# Patient Record
Sex: FEMALE | Race: WHITE | Employment: UNEMPLOYED | URBAN - METROPOLITAN AREA
[De-identification: names, ages, dates, MRNs, and addresses within clinical notes are randomized per-mention and may not be internally consistent; named-entity substitution may affect disease eponyms.]

---

## 2020-09-13 ENCOUNTER — HOSPITAL ENCOUNTER (INPATIENT)
Age: 35
LOS: 3 days | Discharge: HOME | DRG: 754 | End: 2020-09-16
Attending: PSYCHIATRY & NEUROLOGY | Admitting: INTERNAL MEDICINE
Payer: COMMERCIAL

## 2020-09-13 PROCEDURE — 1240000000 HC EMOTIONAL WELLNESS R&B

## 2020-09-13 RX ORDER — PRAZOSIN HYDROCHLORIDE 1 MG/1
1 CAPSULE ORAL NIGHTLY
Status: ON HOLD | COMMUNITY
End: 2020-09-16 | Stop reason: HOSPADM

## 2020-09-13 RX ORDER — HYDROXYZINE 50 MG/1
50 TABLET, FILM COATED ORAL 3 TIMES DAILY PRN
Status: DISCONTINUED | OUTPATIENT
Start: 2020-09-13 | End: 2020-09-13 | Stop reason: CLARIF

## 2020-09-13 RX ORDER — VENLAFAXINE HYDROCHLORIDE 37.5 MG/1
37.5 CAPSULE, EXTENDED RELEASE ORAL DAILY
Status: ON HOLD | COMMUNITY
End: 2020-09-16 | Stop reason: HOSPADM

## 2020-09-13 RX ORDER — TRAZODONE HYDROCHLORIDE 50 MG/1
50 TABLET ORAL NIGHTLY PRN
Status: DISCONTINUED | OUTPATIENT
Start: 2020-09-13 | End: 2020-09-16 | Stop reason: HOSPADM

## 2020-09-13 RX ORDER — IBUPROFEN 400 MG/1
400 TABLET ORAL EVERY 6 HOURS PRN
Status: DISCONTINUED | OUTPATIENT
Start: 2020-09-13 | End: 2020-09-16 | Stop reason: HOSPADM

## 2020-09-13 RX ORDER — POLYETHYLENE GLYCOL 3350 17 G
2 POWDER IN PACKET (EA) ORAL
Status: DISCONTINUED | OUTPATIENT
Start: 2020-09-13 | End: 2020-09-16 | Stop reason: HOSPADM

## 2020-09-13 RX ORDER — NICOTINE 21 MG/24HR
1 PATCH, TRANSDERMAL 24 HOURS TRANSDERMAL DAILY PRN
Status: DISCONTINUED | OUTPATIENT
Start: 2020-09-13 | End: 2020-09-16 | Stop reason: HOSPADM

## 2020-09-13 RX ORDER — HYDROXYZINE PAMOATE 50 MG/1
50 CAPSULE ORAL 3 TIMES DAILY PRN
Status: DISCONTINUED | OUTPATIENT
Start: 2020-09-13 | End: 2020-09-16 | Stop reason: HOSPADM

## 2020-09-13 RX ORDER — ACETAMINOPHEN 325 MG/1
650 TABLET ORAL EVERY 4 HOURS PRN
Status: DISCONTINUED | OUTPATIENT
Start: 2020-09-13 | End: 2020-09-16 | Stop reason: HOSPADM

## 2020-09-13 ASSESSMENT — SLEEP AND FATIGUE QUESTIONNAIRES
DIFFICULTY ARISING: NO
DIFFICULTY FALLING ASLEEP: YES
RESTFUL SLEEP: NO
DO YOU USE A SLEEP AID: NO
DIFFICULTY STAYING ASLEEP: YES
AVERAGE NUMBER OF SLEEP HOURS: 4
SLEEP PATTERN: DIFFICULTY FALLING ASLEEP;NIGHTMARES/TERRORS
DO YOU HAVE DIFFICULTY SLEEPING: YES

## 2020-09-13 ASSESSMENT — LIFESTYLE VARIABLES: HISTORY_ALCOHOL_USE: NO

## 2020-09-13 ASSESSMENT — PATIENT HEALTH QUESTIONNAIRE - PHQ9: SUM OF ALL RESPONSES TO PHQ QUESTIONS 1-9: 9
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
## 2020-09-13 NOTE — BH NOTE
`[facility] [place]  Admission Note     Admission Type:   Admission Type:  Involuntary    Reason for admission:  Reason for Admission: took an overdose of multiple benadryl in a suicide attempt    PATIENT STRENGTHS:  Strengths: Communication    Patient Strengths and Limitations:  Limitations: Hopeless about future, Unrealistic self-view, Difficult relationships / poor social skills, Lacks leisure interests, Tendency to isolate self    Addictive Behavior:   Addictive Behavior  In the past 3 months, have you felt or has someone told you that you have a problem with:  : None  Do you have a history of Chemical Use?: No  Do you have a history of Alcohol Use?: No  Do you have a history of Street Drug Abuse?: No  Histroy of Prescripton Drug Abuse?: No    Medical Problems:   Past Medical History:   Diagnosis Date    Concussion     UTI (urinary tract infection)        Status EXAM:  Status and Exam  Normal: No  Facial Expression: Flat, Sad  Affect: Blunt  Level of Consciousness: Alert  Mood:Normal: No  Mood: Depressed  Motor Activity:Normal: No  Motor Activity: Decreased  Interview Behavior: Evasive, Cooperative  Preception: [place] to Person, Christofer Carton to Time, [place] to Place, [place] to Situation  Attention:Normal: Yes  Thought Content:Normal: Yes  Hallucinations: None  Delusions: No  Memory:Normal: No  Memory: Poor Recent  Insight and Judgment: No  Insight and Judgment: Poor Insight, Poor Judgment  Present Suicidal Ideation: No  Present Homicidal Ideation: No    Tobacco Screening:  Practical Counseling, on admission, ivon X, if applicable and completed (first 3 are required if patient doesn't refuse):            ( )  Recognizing danger situations (included triggers and roadblocks)                    ( )  Coping skills (new ways to manage stress, exercise, relaxation techniques, changing routine, distraction)                                                           ( )  Basic information about quitting (benefits of quitting, techniques in how to quit, available resources  ( ) Referral for counseling faxed to Sarah                                           ( ) Patient refused counseling  ( ) Patient has not smoked in the last 30 days    Metabolic Screening:    No results found for: LABA1C    No results found for: CHOL  No results found for: TRIG  No results found for: HDL  No components found for: LDLCAL  No results found for: LABVLDL      There is no height or weight on file to calculate BMI. BP Readings from Last 2 Encounters:   09/13/20 130/82   07/30/18 (!) 142/102           Pt admitted with followings belongings:  Dentures: None  Vision - Corrective Lenses: None  Hearing Aid: None  Jewelry: None  Body Piercings Removed: N/A  Clothing: Pants, Shirt, Footwear  Were All Patient Medications Collected?: Not Applicable  Other Valuables: Cell phone     Patient oriented to surroundings and program expectations and copy of patient rights given. Received admission packet:  YES. Consents reviewed, signed YES. . Patient verbalize understanding:  YES.     Patient education on precautions: Capo Ochoa RN

## 2020-09-14 LAB
ANION GAP SERPL CALCULATED.3IONS-SCNC: 8 MMOL/L (ref 3–16)
BUN BLDV-MCNC: 10 MG/DL (ref 7–20)
CALCIUM SERPL-MCNC: 9.1 MG/DL (ref 8.3–10.6)
CHLORIDE BLD-SCNC: 102 MMOL/L (ref 99–110)
CHOLESTEROL, TOTAL: 162 MG/DL (ref 0–199)
CO2: 27 MMOL/L (ref 21–32)
CREAT SERPL-MCNC: 0.8 MG/DL (ref 0.6–1.1)
GFR AFRICAN AMERICAN: >60
GFR NON-AFRICAN AMERICAN: >60
GLUCOSE BLD-MCNC: 91 MG/DL (ref 70–99)
HDLC SERPL-MCNC: 83 MG/DL (ref 40–60)
LDL CHOLESTEROL CALCULATED: 55 MG/DL
POTASSIUM SERPL-SCNC: 3.3 MMOL/L (ref 3.5–5.1)
SODIUM BLD-SCNC: 137 MMOL/L (ref 136–145)
TRIGL SERPL-MCNC: 122 MG/DL (ref 0–150)
TSH SERPL DL<=0.05 MIU/L-ACNC: 0.93 UIU/ML (ref 0.27–4.2)
VLDLC SERPL CALC-MCNC: 24 MG/DL

## 2020-09-14 PROCEDURE — 99223 1ST HOSP IP/OBS HIGH 75: CPT | Performed by: PSYCHIATRY & NEUROLOGY

## 2020-09-14 PROCEDURE — 80048 BASIC METABOLIC PNL TOTAL CA: CPT

## 2020-09-14 PROCEDURE — 6370000000 HC RX 637 (ALT 250 FOR IP): Performed by: PSYCHIATRY & NEUROLOGY

## 2020-09-14 PROCEDURE — 1240000000 HC EMOTIONAL WELLNESS R&B

## 2020-09-14 PROCEDURE — 80061 LIPID PANEL: CPT

## 2020-09-14 PROCEDURE — 36415 COLL VENOUS BLD VENIPUNCTURE: CPT

## 2020-09-14 PROCEDURE — 83036 HEMOGLOBIN GLYCOSYLATED A1C: CPT

## 2020-09-14 PROCEDURE — 84443 ASSAY THYROID STIM HORMONE: CPT

## 2020-09-14 RX ORDER — NALTREXONE HYDROCHLORIDE 50 MG/1
50 TABLET, FILM COATED ORAL
Status: DISCONTINUED | OUTPATIENT
Start: 2020-09-14 | End: 2020-09-16 | Stop reason: HOSPADM

## 2020-09-14 RX ORDER — RISPERIDONE 1 MG/1
1 TABLET, FILM COATED ORAL 2 TIMES DAILY
Status: DISCONTINUED | OUTPATIENT
Start: 2020-09-14 | End: 2020-09-15

## 2020-09-14 RX ORDER — LAMOTRIGINE 25 MG/1
50 TABLET ORAL DAILY
Status: DISCONTINUED | OUTPATIENT
Start: 2020-09-14 | End: 2020-09-16 | Stop reason: HOSPADM

## 2020-09-14 RX ADMIN — RISPERIDONE 1 MG: 1 TABLET ORAL at 14:17

## 2020-09-14 RX ADMIN — RISPERIDONE 1 MG: 1 TABLET ORAL at 22:06

## 2020-09-14 RX ADMIN — TRAZODONE HYDROCHLORIDE 50 MG: 50 TABLET ORAL at 22:06

## 2020-09-14 RX ADMIN — NALTREXONE HYDROCHLORIDE 50 MG: 50 TABLET, FILM COATED ORAL at 14:17

## 2020-09-14 RX ADMIN — LAMOTRIGINE 50 MG: 25 TABLET ORAL at 14:17

## 2020-09-14 ASSESSMENT — SLEEP AND FATIGUE QUESTIONNAIRES
AVERAGE NUMBER OF SLEEP HOURS: 4
DIFFICULTY FALLING ASLEEP: YES
DO YOU HAVE DIFFICULTY SLEEPING: YES
DO YOU USE A SLEEP AID: NO
DIFFICULTY ARISING: NO
DIFFICULTY STAYING ASLEEP: YES
SLEEP PATTERN: DIFFICULTY FALLING ASLEEP
RESTFUL SLEEP: NO

## 2020-09-14 ASSESSMENT — LIFESTYLE VARIABLES: HISTORY_ALCOHOL_USE: YES

## 2020-09-14 ASSESSMENT — PATIENT HEALTH QUESTIONNAIRE - PHQ9: SUM OF ALL RESPONSES TO PHQ QUESTIONS 1-9: 18

## 2020-09-14 NOTE — H&P
Mother     No Known Problems Father        REVIEW OF SYSTEMS:       Constitutional: Negative for fever   HENT: Negative for sore throat   Eyes: Negative for redness   Respiratory: Negative  for dyspnea, cough   Cardiovascular: Negative for chest pain   Gastrointestinal: Negative for vomiting, diarrhea   Genitourinary: Negative for hematuria   Positive for vaginal discharge  Musculoskeletal: Negative for arthralgias   Skin: Negative for rash   Neurological: Negative for syncope   Hematological: Negative for adenopathy   Psychiatric/Behavorial: Negative for anxiety    PHYSICAL EXAM:    BP (!) 154/78   Pulse 78   Temp 97.7 °F (36.5 °C) (Temporal)   Resp 16   SpO2 98%     Gen: No distress. Alert. Eyes: PERRL. No sclera icterus. No conjunctival injection. ENT: No discharge. Pharynx clear. Neck:  Trachea midline. No JVD  Resp: No accessory muscle use. No crackles. No wheezes. No rhonchi. CV: Regular rate. Regular rhythm. No murmur. No rub. No edema. GI: Non-tender. Non-distended. Normal bowel sounds. Skin: Warm and dry. No rash on exposed extremities. M/S: No cyanosis. No clubbing. Neuro: Awake. Grossly nonfocal, CN II-XII intact     Psych: Oriented x 3. No anxiety or agitation.      U/A:    Lab Results   Component Value Date    NITRITE negative 05/21/2011    COLORU YELLOW 07/15/2018    WBCUA 10 07/15/2018    RBCUA 7 07/15/2018    MUCUS 2+ 01/21/2011    BACTERIA RARE 07/15/2018    CLARITYU CLOUDY 07/15/2018    SPECGRAV 1.016 07/15/2018    LEUKOCYTESUR SMALL 07/15/2018    BLOODU MODERATE 07/15/2018    GLUCOSEU Negative 07/15/2018    GLUCOSEU NEGATIVE 01/21/2011     Pertinent previous results reviewed     [facility] from [facility]  - Sinus tachycardia rate of 101  - QTc: 466  - no ST elevation, depression or T wave inversion    CBC normal    UDS positive for cocaine and tricyclic antidepressants    Serum HCG: negative    BMP: K - 3.1, otherwise normal    ASSESSMENT/PLAN:    #PTSD  #Bipolar DO  - per psychiatry team    #Suicidal Ideation  #Intentional Overdose  - OD on Benadryl - reported taking 15-20 tablets of twenty five mg Benadryl  - evaluated at [facility] ED, poison control was called, no activated charcoal recommended unless symptomatic; pt did not manifest any signs of anticholinergic toxicity -> medically cleared for [facility]    #Vaginal discharge  -Patient reports recurrent bacterial vaginosis for which she follows with gynecology. She states her symptoms currently are consistent with bacterial vaginosis and her gynecologist had called in a prescription for metronidazole vaginal gel for her prior to going to the emergency room. I will start a 5-day course of MetroGel now. #Hypokalemia  - K 3.3  - replaced with PO supplementation today     #Alcohol Abuse  -She reports binge drinking on 1 to 2 days/week.   Low concern for impending alcohol withdrawal    #Cocaine Abuse  - UDS + cocaine, she denies use     Edie Pope PA-C  9/15/2020 1:58 PM

## 2020-09-14 NOTE — PLAN OF CARE
Attempted to round on patient today during daily rounds. Pt was seen on the phone, very emotional and tearful. Pt would like to hold off on physical exam at this time and requested H&P to be completed tomorrow. Discussed with my colleague, Cristine Joyce PA-C, who will round on patient tomorrow for H&P.     Gina Hurtado PA-C 1:31 PM 9/14/2020

## 2020-09-14 NOTE — FLOWSHEET NOTE
09/14/20 [id number]   Leisure Activity 1   3 Favorite Leisure Activities surf the internet (social media)   Frequency > 2 hours/day   Last time today   Barriers to participating  social (ie. no one to do it with)   Leisure Activity 2   [address]ing ([address])   Frequency  > 2 hours/day   Last time  this week   Leisure Activity 3   [address]  listen to music (classic rock, 90s)   Frequency  <2 hours/day   Last time  this week   Social   Patient reports spending the majority of their free time alone   Patient verbalizes a preference for spending free time with a group   Patients perception of support system negative/weak  (\"I have no support system\")   Patients perception of barriers to socializing with others include(s) no perceived barriers   Social Details live with boyfriend, gets alone time   Beliefs & Coping   Has difficulty dealing with feelings   Yes   Internalizes feelings/Keeps feelings in No   Externalizes feelings through aggressiveness or poor temper control  Yes   Feels uncomfortable around others  No   Has difficulty talking to others  No   Depends on others for direction or decisions Yes  (boyfriend)   Difficulty dealing with anger of others  Yes   Difficulty dealing with own anger  Yes   Difficulty managing stress Yes   Frequently has difficulty with relationships  Yes   which,who,where at work;with friends/peers;in family   Has recently perceived/experienced loss, disappointment, humiliation or failure  Yes  (Pt reports losing custody of 5 y/o son)   General perception about self does not like self   Attitude about abilities occasionally fails   Locus of Control  sometimes   Belief about recovery Recovery is possible   Patient Identified Strengths  good cook, unable to identify further strength   Patient Identified Limitations  emotional disturbance (\"I have a bad fear of abandonment, so I push people away. I'm hard to get along with. \"   Perception of most stressful event prior to hospitalization Pt reports losing son in December   Perception of changes needed \"I want to quit drinking. It makes my emotions worse\"   Strengths and Limitations   Strengths Independent in basic self-care activities; Realistic self-view   Limitations Apathetic / unmotivated;General negative or hopeless attitude about future/recovery; Difficult relationships / poor social skills     Met with pt 1:1 in [employer] to complete AT/OT assessment. Pt sitting with legs raised, holding knees to chest with BUE tension, exaggerated wide-eyes. Pt reports largest influence on current emotional disturbance was the loss of custody of her son in December, leading to amajor shift in social structure, positive coping. Pt reports drinking alcohol as a means of escapism, avoiding opportunities to engage with others. Denies leisure interests at present. Will follow up to encourage engagement in group therapy, continue assessing psychosocial need, recreation/leisure preferences.     Soto Damon, MM, MT-BC

## 2020-09-14 NOTE — PLAN OF CARE
Patient was out with patient group, but spent most of time on the phone, talking to her boyfriend. Patient reported that she was here, because she overdosed on benadryl. Patient did state that she regrets the overdose. Patient stated being in the middle of a break-up, with her boyfriend. \"I'm not sure, where we stand. He hates me now. \" Patient denied feelings of self harm.  Joaquin Castillo R.N.

## 2020-09-14 NOTE — FLOWSHEET NOTE
Education   Education [employer] graduate -GED   Work History   Currently employed No   /VA involvement none   Leisure/Activity   Past interests boating, hiking   Present interests cook and drinking   Current daily activity take care of the house and cook   Social with friends/family No   Cultural and Spiritual   Spiritual concerns No   Cultural concerns No   Admitted to [facility] due to intentional OD on benadryl. Complains of a drinking problem and in inpatient rehab x1 year at [place] [address]. Interested in outpatient AoD services.

## 2020-09-14 NOTE — PROGRESS NOTES
Occupational Therapy      Received OT orders. Hold OT eval for H&P. Plan to re-attempt as time permits.     Sukhjinder Alan, OTR/L  #[id number]

## 2020-09-14 NOTE — H&P
alcohol abuse  Axis 2: Borderline Personality  Disorder   Herod 3: See Medical History  Axis 4: Problems with primary support group, Occupational problems, Housing problems, Economic problems and Other psychosocial and environmental problems      Tx plan:    prevent self injury, stabilize affect, restore sleep, treat depression, treat addictions, establish/maintain aftercare. All conditions present on admission are being treated while pt is hospitalized.      Medications  Current Facility-Administered Medications   Medication Dose Route Frequency Provider Last Rate Last Dose    lamoTRIgine (LAMICTAL) tablet 50 mg  50 mg Oral Daily Chey Mayes MD        naltrexone (DEPADE) tablet 50 mg  50 mg Oral Daily with breakfast Chey Mayes MD        risperiDONE (RISPERDAL) tablet 1 mg  1 mg Oral BID Chey Mayes MD        acetaminophen (TYLENOL) tablet 650 mg  650 mg Oral Q4H PRN Chey Mayes MD        ibuprofen (ADVIL;MOTRIN) tablet 400 mg  400 mg Oral Q6H PRN Chey Mayes MD        traZODone (DESYREL) tablet 50 mg  50 mg Oral Nightly PRN Chey Mayes MD        magnesium hydroxide (MILK OF MAGNESIA) 400 MG/5ML suspension 30 mL  30 mL Oral Daily PRN Chey Mayes MD        nicotine polacrilex (COMMIT) lozenge 2 mg  2 mg Oral Q2H PRN Chey Mayes MD        nicotine (NICODERM CQ) 21 MG/24HR 1 patch  1 patch Transdermal Daily PRN Chey Mayes MD        nicotine polacrilex (NICORETTE) gum 4 mg  4 mg Oral Q2H PRN Chey Mayes MD        hydrOXYzine (VISTARIL) capsule 50 mg  50 mg Oral TID PRN Chey Mayes MD          lamoTRIgine  50 mg Oral Daily    naltrexone  50 mg Oral Daily with breakfast    risperiDONE  1 mg Oral BID      PRN Meds: acetaminophen, ibuprofen, traZODone, magnesium hydroxide, nicotine polacrilex, nicotine, nicotine polacrilex, hydrOXYzine   Estimated length of stay: 2-3 days  Prognosis:  good   Criteria for Discharge:    Not suicidal, sleeping well, affect stable, depression improving, eating well, aftercare arranged.      Spent at least 70 minutes with evaluation process and more than 50% of the time was spent obtaining history and planning treatment with the patient

## 2020-09-14 NOTE — BH NOTE
[facility]  Initial Interdisciplinary Treatment Plan NOTE    Review Date & Time: 9/14/2020 0930    Patient was not in treatment team    Admission Type:   Admission Type: Involuntary    Reason for admission:  Reason for Admission: took an overdose of multiple benadryl in a suicide attempt      Estimated Length of Stay Update:  2-3 days  Estimated Discharge Date Update: 9/16/2020    PATIENT STRENGTHS:  Patient Strengths Strengths: Social Skills, No significant Physical Illness  Patient Strengths and Limitations:Limitations: Tendency to isolate self, Difficult relationships / poor social skills, Demonstrates discomfort with /lack of social skills, Multiple barriers to leisure interests, Difficulty problem solving/relies on others to help solve problems, Inappropriate/potentially harmful leisure interests, Lacks leisure interests  Addictive Behavior:Addictive Behavior  In the past 3 months, have you felt or has someone told you that you have a problem with:  : None  Do you have a history of Chemical Use?: No  Do you have a history of Alcohol Use?: Yes  Do you have a history of Street Drug Abuse?: No  Histroy of Prescripton Drug Abuse?: No  Medical Problems:  Past Medical History:   Diagnosis Date    Concussion     UTI (urinary tract infection)        EDUCATION:   Learner Progress Toward Treatment Goals: Reviewed results and recommendations of this team and Reviewed goals and plan of care    Method: Individual    Outcome: Verbalized understanding and Needs reinforcement    PATIENT GOALS: Patient did not attend goals group    PLAN/TREATMENT RECOMMENDATIONS UPDATE:  Patient will take medication as prescribed, eat 75% of meals, attend groups, participate in milieu activities, participate in treatment team and care planning for discharge and follow up.     GOALS UPDATE:   Time frame for Short-Term Goals: 1-2 days    Elba Blanc RN

## 2020-09-15 PROBLEM — N76.0 BACTERIAL VAGINOSIS: Status: ACTIVE | Noted: 2020-09-15

## 2020-09-15 PROBLEM — F10.10 ALCOHOL CONSUMPTION BINGE DRINKING: Status: ACTIVE | Noted: 2020-09-15

## 2020-09-15 PROBLEM — E87.6 HYPOKALEMIA: Status: ACTIVE | Noted: 2020-09-15

## 2020-09-15 PROBLEM — B96.89 BACTERIAL VAGINOSIS: Status: ACTIVE | Noted: 2020-09-15

## 2020-09-15 LAB
ESTIMATED AVERAGE GLUCOSE: 102.5 MG/DL
HBA1C MFR BLD: 5.2 %

## 2020-09-15 PROCEDURE — 6370000000 HC RX 637 (ALT 250 FOR IP): Performed by: PHYSICIAN ASSISTANT

## 2020-09-15 PROCEDURE — 1240000000 HC EMOTIONAL WELLNESS R&B

## 2020-09-15 PROCEDURE — 99233 SBSQ HOSP IP/OBS HIGH 50: CPT | Performed by: PSYCHIATRY & NEUROLOGY

## 2020-09-15 PROCEDURE — 6370000000 HC RX 637 (ALT 250 FOR IP): Performed by: PSYCHIATRY & NEUROLOGY

## 2020-09-15 PROCEDURE — 99222 1ST HOSP IP/OBS MODERATE 55: CPT | Performed by: PHYSICIAN ASSISTANT

## 2020-09-15 RX ORDER — POTASSIUM CHLORIDE 20 MEQ/1
40 TABLET, EXTENDED RELEASE ORAL ONCE
Status: COMPLETED | OUTPATIENT
Start: 2020-09-15 | End: 2020-09-15

## 2020-09-15 RX ORDER — METRONIDAZOLE 7.5 MG/G
GEL VAGINAL 2 TIMES DAILY
Status: DISCONTINUED | OUTPATIENT
Start: 2020-09-15 | End: 2020-09-16 | Stop reason: HOSPADM

## 2020-09-15 RX ADMIN — LAMOTRIGINE 50 MG: 25 TABLET ORAL at 11:39

## 2020-09-15 RX ADMIN — POTASSIUM CHLORIDE 40 MEQ: 1500 TABLET, EXTENDED RELEASE ORAL at 14:14

## 2020-09-15 RX ADMIN — NALTREXONE HYDROCHLORIDE 50 MG: 50 TABLET, FILM COATED ORAL at 11:39

## 2020-09-15 RX ADMIN — METRONIDAZOLE: 7.5 GEL VAGINAL at 16:13

## 2020-09-15 RX ADMIN — HYDROXYZINE PAMOATE 50 MG: 50 CAPSULE ORAL at 20:03

## 2020-09-15 NOTE — PLAN OF CARE
Problem: Suicide risk  Goal: Provide patient with safe environment  Description: Provide patient with safe environment  Outcome: Ongoing   Patient displaying safe behaviors on unit. Denies SI/HI. 15 minute rounds continued for safety. Problem: Depressive Behavior With or Without Suicide Precautions:  Goal: Ability to disclose and discuss suicidal ideas will improve  Description: Ability to disclose and discuss suicidal ideas will improve  Outcome: Ongoing   Patient denies SI/HI. Regrets overdose. Patient says family issues is the reason for the overdose. Patient in the middle of a break up with boyfriend. Spent a lot of time on the phone. Patient compliant with medication. PRN trazodone given for sleep.

## 2020-09-15 NOTE — PROGRESS NOTES
Department of Psychiatry  Attending Progress Note  Chief Complaint: follow-up depression and suicide attempt   Patient's chart was reviewed and collaborated with  about the treatment plan. SUBJECTIVE:    Patient is feeling better. Suicidal ideation:  passive. Patient does not have medication side effects. Pt stated that she still gets emotional especially when talking with BF but stated that she feels that she is in control of her emotions. Pt stated that risperdal making her tired. Pt denied any side effects and we discussed getting invega sustenna today and d/c risperdal. Pt agreeable with this plan. Pt cont to be seclusive. ROS: Patient has new complaints: no  Sleeping adequately:  Yes   Appetite adequate: Yes  Attending groups: No: isolated to her room  Visitors:No    OBJECTIVE    Physical  VITALS:  /78   Pulse 88   Temp 97.1 °F (36.2 °C) (Temporal)   Resp 16   SpO2 98%     Mental Status Examination:  Patients appearance was well-appearing, hospital attire, lying in bed, fair grooming and fair hygiene. Thoughts are Logical. Homicidal ideations none. No abnormal movements, tics or mannerisms. Memory intact Aims 0. Concentration Fair. Alert and oriented X 4. Insight and Judgement limited to poor. Patient was cooperative.  Patient gait normal. Mood tired, affect blunted Hallucinations Absent, suicidal ideations no specific plan to harm self Speech fluent and spontaneous    Data  Labs:   Admission on 09/13/2020   Component Date Value Ref Range Status    Hemoglobin A1C 09/14/2020 5.2  See comment % Final    Comment: Comment:  Diagnosis of Diabetes: > or = 6.5%  Increased risk of diabetes (Prediabetes): 5.7-6.4%  Glycemic Control: Nonpregnant Adults: <7.0%                    Pregnant: <6.0%        eAG 09/14/2020 102.5  mg/dL Final    TSH 09/14/2020 0.93  0.27 - 4.20 uIU/mL Final    Cholesterol, Total 09/14/2020 162  0 - 199 mg/dL Final    Triglycerides 09/14/2020 122  0 - 150 mg/dL Final    HDL 09/14/2020 83* 40 - 60 mg/dL Final    LDL Calculated 09/14/2020 55  <100 mg/dL Final    VLDL Cholesterol Calculated 09/14/2020 24  Not Established mg/dL Final    Sodium 09/14/2020 137  136 - 145 mmol/L Final    Potassium 09/14/2020 3.3* 3.5 - 5.1 mmol/L Final    Chloride 09/14/2020 102  99 - 110 mmol/L Final    CO2 09/14/2020 27  21 - 32 mmol/L Final    Anion Gap 09/14/2020 8  3 - 16 Final    Glucose 09/14/2020 91  70 - 99 mg/dL Final    BUN 09/14/2020 10  7 - 20 mg/dL Final    CREATININE 09/14/2020 0.8  0.6 - 1.1 mg/dL Final    GFR Non- 09/14/2020 >60  >60 Final    Comment: >60 mL/min/1.73m2 EGFR, calc. for ages 25 and older using the  MDRD formula (not corrected for weight), is valid for stable  renal function.  GFR  09/14/2020 >60  >60 Final    Comment: Chronic Kidney Disease: less than 60 ml/min/1.73 sq.m. Kidney Failure: less than 15 ml/min/1.73 sq.m. Results valid for patients 18 years and older.       Calcium 09/14/2020 9.1  8.3 - 10.6 mg/dL Final            Medications  Current Facility-Administered Medications: paliperidone palmitate ER (INVEGA SUSTENNA) IM injection 234 mg, 234 mg, Intramuscular, Once **FOLLOWED BY** [START ON 9/19/2020] paliperidone palmitate ER (INVEGA SUSTENNA) IM injection 156 mg, 156 mg, Intramuscular, Once **FOLLOWED BY** [START ON 10/10/2020] paliperidone palmitate ER (INVEGA SUSTENNA) IM injection 156 mg, 156 mg, Intramuscular, Q30 Days  lamoTRIgine (LAMICTAL) tablet 50 mg, 50 mg, Oral, Daily  naltrexone (DEPADE) tablet 50 mg, 50 mg, Oral, Daily with breakfast  acetaminophen (TYLENOL) tablet 650 mg, 650 mg, Oral, Q4H PRN  ibuprofen (ADVIL;MOTRIN) tablet 400 mg, 400 mg, Oral, Q6H PRN  traZODone (DESYREL) tablet 50 mg, 50 mg, Oral, Nightly PRN  magnesium hydroxide (MILK OF MAGNESIA) 400 MG/5ML suspension 30 mL, 30 mL, Oral, Daily PRN  nicotine polacrilex (COMMIT) lozenge 2 mg, 2 mg, Oral, Q2H PRN  nicotine (NICODERM CQ) 21 MG/24HR 1 patch, 1 patch, Transdermal, Daily PRN  nicotine polacrilex (NICORETTE) gum 4 mg, 4 mg, Oral, Q2H PRN  hydrOXYzine (VISTARIL) capsule 50 mg, 50 mg, Oral, TID PRN    ASSESSMENT AND PLAN    D/c risperdal  Start invega sustenna loading dose panel    1. Patient s symptoms   are improving  2. Probable discharge is 2-3 days  3. Discharge planning is incomplete  4 Suicidal ideation is better  5 total time 40 minutes I spent 35 minutes face to face and more than 50 % was spent coordinating care, exploring sx's and discussing pharmacotherapy.

## 2020-09-15 NOTE — PLAN OF CARE
Problem: Suicide risk  Goal: Provide patient with safe environment  Description: Provide patient with safe environment  9/15/2020 0942 by Julita Sawant RN  Outcome: Ongoing  9/14/2020 2235 by Sharyn Barajas RN  Outcome: Ongoing     Problem: Depressive Behavior With or Without Suicide Precautions:  Goal: Able to verbalize and/or display a decrease in depressive symptoms  Description: Able to verbalize and/or display a decrease in depressive symptoms  Outcome: Ongoing  Goal: Ability to disclose and discuss suicidal ideas will improve  Description: Ability to disclose and discuss suicidal ideas will improve  9/15/2020 0942 by Julita Sawant RN  Outcome: Ongoing  9/14/2020 2235 by Sharyn Barajas RN  Outcome: Ongoing     Patient states, \"I feel sleepy. \" She is refusing to take her morning medication. She states, \" I will take my medication later when I wake up. \" Patient denies SI/HI/AVH.

## 2020-09-15 NOTE — PROGRESS NOTES
Occupational Therapy      Attempted OT eval.  Pt. Received in bed. Pt. Refused OT eval, stating that she is too drowsy. Plan to re-attempt later today as time permits.     Pilo Slade, OTR/L  #[id number]

## 2020-09-15 NOTE — BH NOTE
Patient had upsetting phone call. Checked on patient in room. Patient denies SI. Patient says she is \"alright\" just getting tired and wants to sleep.

## 2020-09-16 VITALS
RESPIRATION RATE: 16 BRPM | OXYGEN SATURATION: 96 % | DIASTOLIC BLOOD PRESSURE: 64 MMHG | TEMPERATURE: 97.7 F | HEART RATE: 70 BPM | SYSTOLIC BLOOD PRESSURE: 123 MMHG

## 2020-09-16 PROCEDURE — 5130000000 HC BRIDGE APPOINTMENT

## 2020-09-16 PROCEDURE — 6370000000 HC RX 637 (ALT 250 FOR IP): Performed by: PSYCHIATRY & NEUROLOGY

## 2020-09-16 PROCEDURE — 99239 HOSP IP/OBS DSCHRG MGMT >30: CPT | Performed by: PSYCHIATRY & NEUROLOGY

## 2020-09-16 RX ORDER — METRONIDAZOLE 7.5 MG/G
GEL VAGINAL
Qty: 1 TUBE | Refills: 0 | Status: SHIPPED | OUTPATIENT
Start: 2020-09-16 | End: 2020-09-23

## 2020-09-16 RX ORDER — NALTREXONE HYDROCHLORIDE 50 MG/1
50 TABLET, FILM COATED ORAL
Qty: 30 TABLET | Refills: 0 | Status: SHIPPED | OUTPATIENT
Start: 2020-09-17

## 2020-09-16 RX ORDER — LAMOTRIGINE 25 MG/1
50 TABLET ORAL DAILY
Qty: 60 TABLET | Refills: 0 | Status: SHIPPED | OUTPATIENT
Start: 2020-09-17 | End: 2020-11-04 | Stop reason: SDUPTHER

## 2020-09-16 RX ADMIN — METRONIDAZOLE: 7.5 GEL VAGINAL at 09:57

## 2020-09-16 RX ADMIN — NALTREXONE HYDROCHLORIDE 50 MG: 50 TABLET, FILM COATED ORAL at 08:50

## 2020-09-16 RX ADMIN — LAMOTRIGINE 50 MG: 25 TABLET ORAL at 08:50

## 2020-09-16 ASSESSMENT — PAIN SCALES - GENERAL: PAINLEVEL_OUTOF10: 0

## 2020-09-16 NOTE — GROUP NOTE
Group Therapy Note    Date: 9/15/2020    Group Start Time: 2015  Group End Time: 2030  Group Topic: Wrap-Up    [address]        Group Therapy Note    Attendees: Goals and importance of goal setting discussed. Night time milieu activities discussed. Patient's Goal:  Reach out for help    Notes:  Successful     Status After Intervention:  Improved    Participation Level:  Active Listener and Interactive    Participation Quality: Appropriate and Attentive      Speech:  normal      Thought Process/Content: Logical  Linear      Affective Functioning: Congruent      Mood: anxious and irritable      Level of consciousness:  Alert and Oriented x4      Response to Learning: Progressing to goal      Endings: None Reported    Modes of Intervention: Support      Discipline Responsible: Behavorial Health Tech      Signature:  Cesar Mejia

## 2020-09-16 NOTE — BH NOTE
[facility]  Discharge Note    Pt discharged with followings belongings:   Dentures: None  Vision - Corrective Lenses: None  Hearing Aid: None  Jewelry: None  Body Piercings Removed: N/A  Clothing: Pants, Shirt, Footwear  Were All Patient Medications Collected?: Not Applicable  Other Valuables: Cell phone   Valuables sent home with patient. Valuables retrieved from safe and returned to patient. Patient education on aftercare instructions: yes. Information faxed to N/A by this writer. Patient verbalize understanding of AVS:  yes. Status EXAM upon discharge:  Status and Exam  Normal: No  Facial Expression: Sad, Worried  Affect: Congruent  Level of Consciousness: Alert  Mood:Normal: No  Mood: Depressed, Anxious, Sad  Motor Activity:Normal: No  Motor Activity: Decreased  Interview Behavior: Evasive  Preception: Gould to Person, Delman Boers to Time, Gould to Place, Gould to Situation  Attention:Normal: No  Attention: Unable to Concentrate  Thought Processes: Other(See comment)(Linear, short responses)  Thought Content:Normal: No  Thought Content: Poverty of Content  Hallucinations: None(Pt denies)  Delusions: No  Memory:Normal: Yes  Memory: Poor Recent  Insight and Judgment: No  Insight and Judgment: Poor Judgment, Poor Insight  Present Suicidal Ideation: No  Present Homicidal Ideation: No      Metabolic Screening:    Lab Results   Component Value Date    LABA1C 5.2 09/14/2020       Lab Results   Component Value Date    CHOL 162 09/14/2020     Lab Results   Component Value Date    TRIG 122 09/14/2020     Lab Results   Component Value Date    HDL 83 (H) 09/14/2020     No components found for: [facility] EVALUATION AND TREATMENT [place]  Lab Results   Component Value Date    LABVLDL 24 09/14/2020       Dang Sinclair 12 Appointment completed: Reviewed Discharge Instructions with patient. Patient verbalizes understanding and agreement with the discharge plan using the teachback method.      Referral for Outpatient

## 2020-09-16 NOTE — DISCHARGE SUMMARY
Discharge Summary   Admit Date: 9/13/2020   Discharge Date:    9/16/2020  Spent over 40 minutes with patient and staff on [facility]   Final Dx:   axis I: bad depressed, PTSD, stimulant abuse and alcohol abuse  Axis 2: Borderline Personality  Disorder   [place] 3: See Medical History  Axis 4: Problems with primary support group, Occupational problems, Housing problems, Economic problems and Other psychosocial and environmental problems         Condition on DC  Mood and affect are stable and pt is not suicidal   VITALS:  /64   Pulse 70   Temp 97.7 °F (36.5 °C) (Temporal)   Resp 16   SpO2 96%   Brief Summary Present Illness   31 y/o wf that presented to  ed after ingested an od of benadryl due to worsening depression. Pt stated that back in December she lost custody of her 7 y/o son and the two older kids she has no custody and they chose not to see her. Pt could not give a reasoning for losing custody of her kids. 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 Pt stated that she has been feeling dep, anhedonia, dec energy, lack of sleep and frequent nightmares, inc irritability and impulsivity, hyperarousal, avoidance, hopeless/helpless and inc alcohol consumption. She stated that she only drinks 1-2 a week but until she blacks out, idalia w/d sx's. Pt also was positive for cocaine but will not admit to it. Pt also stated that she has periods of dec need for sleep, rt, impulsivity, distractability, task oriented act. No si or hi and no psychosis. Hospital Course Pt was admitted after taking an od on benadryl due to stress from relational problems. Pt was started on risperdal and lamictal for mood stabilization and latauda and effexor were d/c since she felt were not effective.  Pt reposnded well and due to her poor compliance she took initial dose of invega sustenna and risperdal was d/c. Pt stayed in her room only out for meals and to talk to bf on phone. Pt had improvements of her sx's. Patient stabilized on meds and milieu treatment. Patient was discharged to home to continue recovery in the community. PE: (reviewed) and labs (see medical H&PE)  Labs:    Admission on 09/13/2020   Component Date Value Ref Range Status    Hemoglobin A1C 09/14/2020 5.2  See comment % Final    Comment: Comment:  Diagnosis of Diabetes: > or = 6.5%  Increased risk of diabetes (Prediabetes): 5.7-6.4%  Glycemic Control: Nonpregnant Adults: <7.0%                    Pregnant: <6.0%        eAG 09/14/2020 102.5  mg/dL Final    TSH 09/14/2020 0.93  0.27 - 4.20 uIU/mL Final    Cholesterol, Total 09/14/2020 162  0 - 199 mg/dL Final    Triglycerides 09/14/2020 122  0 - 150 mg/dL Final    HDL 09/14/2020 83* 40 - 60 mg/dL Final    LDL Calculated 09/14/2020 55  <100 mg/dL Final    VLDL Cholesterol Calculated 09/14/2020 24  Not Established mg/dL Final    Sodium 09/14/2020 137  136 - 145 mmol/L Final    Potassium 09/14/2020 3.3* 3.5 - 5.1 mmol/L Final    Chloride 09/14/2020 102  99 - 110 mmol/L Final    CO2 09/14/2020 27  21 - 32 mmol/L Final    Anion Gap 09/14/2020 8  3 - 16 Final    Glucose 09/14/2020 91  70 - 99 mg/dL Final    BUN 09/14/2020 10  7 - 20 mg/dL Final    CREATININE 09/14/2020 0.8  0.6 - 1.1 mg/dL Final    GFR Non- 09/14/2020 >60  >60 Final    Comment: >60 mL/min/1.73m2 EGFR, calc. for ages 25 and older using the  MDRD formula (not corrected for weight), is valid for stable  renal function.  GFR  09/14/2020 >60  >60 Final    Comment: Chronic Kidney Disease: less than 60 ml/min/1.73 sq.m. Kidney Failure: less than 15 ml/min/1.73 sq.m. Results valid for patients 18 years and older.       Calcium 09/14/2020 9.1  8.3 - 10.6 mg/dL Final        Mental Status Exam at Discharge:  Level of consciousness:  awake  Appearance:  well-appearing, in chair, good grooming and good hygiene well-developed, well-nourished  Behavior/Motor:  no abnormalities noted normal gait and station AIMS: 0  Attitude toward examiner:  cooperative, attentive and good eye contact  Speech:  spontaneous, normal rate, normal volume and well articulated  Mood:  dysthymic  Affect:  mood congruent Anxiety: mild  Hallucinations: Absent  Thought processes:  coherent Attention span, Concentration & Attention:  attention span and concentration were age appropriate  Thought content:  Reality based no evidence of delusions OCD: none    Insight: normal insight and judgment Cognition:  oriented to person, place, and time  Fund of Knowledge: average  IQ:average Memory: intact  Suicide:  No specific plan to harm self  Sleep: sleeps through the night  Appetite: ok   Reassess Jill Risk:  no specific plan to harm self Pt has phone numbers to contact if suicidal thoughts recur and states pt will return to the hospital if suicidal feelings return.    Hospital Routine Meds:     [START ON 9/19/2020] paliperidone palmitate  156 mg Intramuscular Once    Followed by   Yasmeen Pappas ON 10/10/2020] paliperidone palmitate  156 mg Intramuscular Q30 Days    metroNIDAZOLE   Vaginal BID    lamoTRIgine  50 mg Oral Daily    naltrexone  50 mg Oral Daily with breakfast      Hospital PRN Meds: acetaminophen, ibuprofen, traZODone, magnesium hydroxide, nicotine polacrilex, nicotine, nicotine polacrilex, hydrOXYzine   Discharge Meds:    Current Discharge Medication List           Details   lamoTRIgine (LAMICTAL) 25 MG tablet Take 2 tablets by mouth daily  Qty: 60 tablet, Refills: 0      naltrexone (DEPADE) 50 MG tablet Take 1 tablet by mouth daily (with breakfast)  Qty: 30 tablet, Refills: 0      !! paliperidone palmitate ER (INVEGA SUSTENNA) 156 MG/ML BRAULIO IM injection Inject 156 mg into the muscle once for 1 dose 9/19/2020  Qty: 156 mg, Refills: 0

## 2020-09-16 NOTE — PLAN OF CARE
Problem: Suicide risk  Goal: Provide patient with safe environment  Description: Provide patient with safe environment  9/16/2020 0943 by Jennyfer Hernandez RN  Outcome: Ongoing  9/15/2020 2201 by Clearance WIL Aguirre  Outcome: Ongoing     Problem: Depressive Behavior With or Without Suicide Precautions:  Goal: Able to verbalize and/or display a decrease in depressive symptoms  Description: Able to verbalize and/or display a decrease in depressive symptoms  9/16/2020 0943 by Jennyfer Hernandez RN  Outcome: Ongoing  9/15/2020 2201 by Clearance WIL Aguirre  Outcome: Ongoing  Goal: Ability to disclose and discuss suicidal ideas will improve  Description: Ability to disclose and discuss suicidal ideas will improve  9/16/2020 0943 by Jennyfer Hernandez RN  Outcome: Ongoing  9/15/2020 2201 by Clearance WIL Aguirre  Outcome: Ongoing     Patient is isolative to room this AM. Patient denies SI/HI/AVH. Patient states they slept poor last night. Patient states, \"I'm still tired. \" Patient encouraged to go to groups. Patient complaint with medications. Patient is evasive.

## 2020-09-16 NOTE — PLAN OF CARE
Problem: Suicide risk  Goal: Provide patient with safe environment  Description: Provide patient with safe environment  9/15/2020 2201 by Katherine Ho RN  Outcome: Ongoing  9/15/2020 0942 by David Gipson RN  Outcome: Ongoing     Problem: Depressive Behavior With or Without Suicide Precautions:  Goal: Able to verbalize and/or display a decrease in depressive symptoms  Description: Able to verbalize and/or display a decrease in depressive symptoms  9/15/2020 2201 by Katherine Ho RN  Outcome: Ongoing  9/15/2020 0942 by David Gipson RN  Outcome: Ongoing    Pt is visible on floor but isolative to self. She states today is first day she has been out and about r/t medication making her drowsy. She is A&O X4 and reports being here r/t recently losing custody of child and relapsed at home break up with boyfriend and was not caring for self and eventually SA by OD. She denies SI/HI/AH/VH at this time. But is concerned related to discharge r/t bf being her only support system. 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 Pt reports decent sleep and god appetite.  Rates anxiety a 6-7 on 0-10 scale at this time

## 2020-09-16 NOTE — BH NOTE
[facility]  Day 3 Interdisciplinary Treatment Plan NOTE    Review Date & Time: 0900 9/16/2020    Patient was not in treatment team    Admission Type:   Admission Type:  Involuntary    Reason for admission:  Reason for Admission: took an overdose of multiple benadryl in a suicide attempt  Estimated Length of Stay Update:  Discharge today   Estimated Discharge Date Update: 9/16/2020    PATIENT STRENGTHS:  Patient Strengths Strengths: Social Skills, No significant Physical Illness  Patient Strengths and Limitations:Limitations: Tendency to isolate self, Difficult relationships / poor social skills, Demonstrates discomfort with /lack of social skills, Multiple barriers to leisure interests, Difficulty problem solving/relies on others to help solve problems, Inappropriate/potentially harmful leisure interests, Lacks leisure interests  Addictive Behavior:Addictive Behavior  In the past 3 months, have you felt or has someone told you that you have a problem with:  : None  Do you have a history of Chemical Use?: No  Do you have a history of Alcohol Use?: Yes  Do you have a history of Street Drug Abuse?: No  Histroy of Prescripton Drug Abuse?: No  Medical Problems:  Past Medical History:   Diagnosis Date    Concussion     UTI (urinary tract infection)        Risk:  Fall RiskTotal: 77  Wallace Scale Wallace Scale Score: 22  BVC Total: 1    Status EXAM:   Status and Exam  Normal: No  Facial Expression: Sad, Worried  Affect: Congruent  Level of Consciousness: Alert  Mood:Normal: No  Mood: Depressed, Anxious, Sad  Motor Activity:Normal: No  Motor Activity: Decreased  Interview Behavior: Evasive  Preception: [place] to Person, [place] to Time, [place] to Place, [place] to Situation  Attention:Normal: No  Attention: Unable to Concentrate  Thought Processes: Other(See comment)(Linear, short responses)  Thought Content:Normal: No  Thought Content: Poverty of Content  Hallucinations: None(Pt denies)  Delusions: No  Memory:Normal: Yes  Memory: Poor Recent  Insight and Judgment: No  Insight and Judgment: Poor Judgment, Poor Insight  Present Suicidal Ideation: No  Present Homicidal Ideation: No    Daily Assessment Last Entry:   Daily Sleep (WDL): Within Defined Limits         Patient Currently in Pain: Denies  Daily Nutrition (WDL): Within Defined Limits    Patient Monitoring:  Frequency of Checks: 4 times per hour, close    Psychiatric Symptoms:   Depression Symptoms  Depression Symptoms: Isolative  Anxiety Symptoms  Anxiety Symptoms: Generalized  Nikky Symptoms  Nikky Symptoms: Poor judgment     Psychosis Symptoms  Delusion Type: No problems reported or observed. Suicide Risk CSSR-S:  1) Within the past month, have you wished you were dead or wished you could go to sleep and not wake up? : Yes  2) Have you actually had any thoughts of killing yourself? : Yes  3) Have you been thinking about how you might kill yourself? : Yes  5) Have you started to work out or worked out the details of how to kill yourself? Do you intend to carry out this plan? : No  6) Have you ever done anything, started to do anything, or prepared to do anything to end your life?: No      EDUCATION:   Learner Progress Toward Treatment Goals: Reviewed results and recommendations of this team and Reviewed goals and plan of care    Method: Individual    Outcome: Verbalized understanding    PATIENT GOALS: Patient did not attend goals group. PLAN/TREATMENT RECOMMENDATIONS UPDATE: Patient will take medication as prescribed, eat 75% of meals, attend groups, participate in milieu activities, participate in treatment team and care planning for discharge and follow up. Patient will be discharging today and will follow up on an outpatient basis for second Invega injection.      GOALS UPDATE:   Time frame for Short-Term Goals: 1-2 days      Elba Blanc RN

## 2020-09-17 PROBLEM — F31.9 BIPOLAR 1 DISORDER (HCC): Status: ACTIVE | Noted: 2020-09-17

## 2020-09-17 PROBLEM — F31.9 BIPOLAR DISORDER (HCC): Status: ACTIVE | Noted: 2020-09-17

## 2020-11-04 ENCOUNTER — OFFICE VISIT (OUTPATIENT)
Dept: PRIMARY CARE CLINIC | Age: 35
End: 2020-11-04
Payer: COMMERCIAL

## 2020-11-04 VITALS
DIASTOLIC BLOOD PRESSURE: 66 MMHG | TEMPERATURE: 95.2 F | RESPIRATION RATE: 14 BRPM | WEIGHT: 139 LBS | BODY MASS INDEX: 26.24 KG/M2 | HEIGHT: 61 IN | HEART RATE: 68 BPM | OXYGEN SATURATION: 96 % | SYSTOLIC BLOOD PRESSURE: 106 MMHG

## 2020-11-04 PROCEDURE — G8419 CALC BMI OUT NRM PARAM NOF/U: HCPCS | Performed by: INTERNAL MEDICINE

## 2020-11-04 PROCEDURE — 99203 OFFICE O/P NEW LOW 30 MIN: CPT | Performed by: INTERNAL MEDICINE

## 2020-11-04 PROCEDURE — G8484 FLU IMMUNIZE NO ADMIN: HCPCS | Performed by: INTERNAL MEDICINE

## 2020-11-04 PROCEDURE — G8427 DOCREV CUR MEDS BY ELIG CLIN: HCPCS | Performed by: INTERNAL MEDICINE

## 2020-11-04 PROCEDURE — 1036F TOBACCO NON-USER: CPT | Performed by: INTERNAL MEDICINE

## 2020-11-04 RX ORDER — VENLAFAXINE HYDROCHLORIDE 37.5 MG/1
37.5 CAPSULE, EXTENDED RELEASE ORAL DAILY
Qty: 30 CAPSULE | Refills: 3 | Status: SHIPPED | OUTPATIENT
Start: 2020-11-04

## 2020-11-04 RX ORDER — LAMOTRIGINE 25 MG/1
50 TABLET ORAL DAILY
Qty: 60 TABLET | Refills: 3 | Status: SHIPPED | OUTPATIENT
Start: 2020-11-04

## 2020-11-04 NOTE — PROGRESS NOTES
Subjective:      Patient ID: Jasbir Gomez is a 30 y.o. female. HPI  Established patient here for a visit to manage acute and chronic medical conditions as detailed below. Bipolar 1 disorder ([facility]  On latuda and lamictal   Patient is compliant w medications, no side effects, effective, provides adequate symptom relief. No new symptoms or problems as noted by patient. The problem is stable, no changes noted by patient. Will consider monitoring labs and refill medications as appropriate. Patient counseled and will continue current plan. Episode of recurrent major depressive disorder ([facility]  On effexor,  Patient is compliant w medications, no side effects, effective, provides adequate symptom relief. No new symptoms or problems as noted by patient. The problem is stable, no changes noted by patient. Will consider monitoring labs and refill medications as appropriate. Patient counseled and will continue current plan.      Hypokalemia  Has been corrected,       Alcohol consumption binge drinking  Counseled,   Will refer to counselor,        Past Medical History:   Diagnosis Date    B[facility] ([facility] 9/17/2020    Bipolar disorder ([facility] 9/17/2020    Episode of recurrent major depressive disorder ([facility] 5/25/2011       Past Surgical History:   Procedure Laterality Date    COSMETIC SURGERY         Current Outpatient Medications   Medication Sig Dispense Refill    lurasidone (LATUDA) 80 MG TABS tablet Take 1 tablet by mouth daily 30 tablet 3    lamoTRIgine (LAMICTAL) 25 MG tablet Take 2 tablets by mouth daily 60 tablet 3    venlafaxine (EFFEXOR XR) 37.5 MG extended release capsule Take 1 capsule by mouth daily 30 capsule 3    naltrexone (DEPADE) 50 MG tablet Take 1 tablet by mouth daily (with breakfast) (Patient not taking: Reported on 11/4/2020) 30 tablet 0    paliperidone palmitate ER (INVEGA SUSTENNA) 156 MG/ML BRAULIO IM injection Inject 156 mg into the muscle once for 1 dose 9/19/2020 156 mg 0    paliperidone palmitate ER (INVEGA SUSTENNA) 156 MG/ML BRAULIO IM injection Inject 156 mg into the muscle every 30 days Due 10/10/2020 (Patient not taking: Reported on 11/4/2020) 1.2 mg 0     No current facility-administered medications for this visit. Allergies   Allergen Reactions    Latex Hives and Rash     hives       Social History     Socioeconomic History    Marital status: Single     Spouse name: Not on file    Number of children: 3    Years of education: GED    Highest education level: Not on file   Occupational History    Occupation: unemployed    Social Needs    Financial resource strain: Not on file    Food insecurity     Worry: Not on file     Inability: Not on file   [employer] needs     Medical: Not on file     Non-medical: Not on file   Tobacco Use    Smoking status: Never Smoker    Smokeless tobacco: Never Used   Substance and Sexual Activity    Alcohol use: Yes     Comment: socially    Drug use: No    Sexual activity: Not Currently     Partners: Male   Lifestyle    Physical activity     Days per week: Not on file     Minutes per session: Not on file    Stress: Not on file   Relationships    Social connections     Talks on phone: Not on file     Gets together: Not on file     Attends Church service: Not on file     Active member of club or organization: Not on file     Attends meetings of clubs or organizations: Not on file     Relationship status: Not on file    Intimate partner violence     Fear of current or ex partner: Not on file     Emotionally abused: Not on file     Physically abused: Not on file     Forced sexual activity: Not on file   Other Topics Concern    Not on file   Social History Narrative    Not on file       Family History   Problem Relation Age of Onset    No Known Problems Mother     No Known Problems Father       Review of Systems  ROS: No unusual headaches or allergy symptoms or blurred vision. No prolonged cough.  No flushing or facial pain or chest pain,dizziness, dyspnea, palpitations, or chest pain on exertion. No syncope. No nausea or vommitting or diarrhea. No jaundice or abdominal pain, change in bowel habits, black or bloody stools. No dysuria or hematuria or frequency of urination. No myalgias or muscle pain. No numbness, weakness, or tingling. No falls, or loss of consciousness. No weight loss or back pain. No falls. No paresthesias. No joint swelling or redness. No joint pain. No recent weight loss. No focal weakness or sensory deficits or paresthesias, No confusion or altered sensorium. No hematemesis. No hearing loss. No siezures. All other systems were reviewed, and review was negative. Objective:   Physical Exam  /66 (Site: Left Upper Arm, Position: Sitting, Cuff Size: Small Adult)   Pulse 68   Temp 95.2 °F (35.1 °C) (Temporal)   Resp 14   Ht 5' 1\" (1.549 m)   Wt 139 lb (63 kg)   LMP 09/30/2020   SpO2 96%   Breastfeeding No   BMI 26.26 kg/m²    The physical exam reveals a patient who appears well, alert and oriented x 3, pleasant, cooperative. Vitals are as noted. Head is atraumatic and normocephalic. Eyes reveal normal conjunctiva, cornea normal, pupils are equal and rective to light. Nasal mucosa is normal. Throat is normal without exudates. Ears reveal normal tympanic membranes. Neck is supple and free of adenopathy, or masses. No thyromegaly. No jugular venous distension. Lungs are clear to auscultation, no rales or rhonchi noted. Heart sounds are regular , no murmurs, clicks, gallops or rubs. Abdomen is soft, no tenderness, masses or organomegaly. Bowel sounds are normally heard. Pelvis: normal. Extremities are normal. Peripheral pulses are normal. Screening neurological exam is normal without focal findings. Cranial nerves are intact, reflexes are symmetrical and muscle strength eaqual. Skin is normal without suspicious lesions noted.      Assessment:      Bipolar 1 disorder ([address]  On latuda and lamictal   Patient is compliant w medications, no side effects, effective, provides adequate symptom relief. No new symptoms or problems as noted by patient. The problem is stable, no changes noted by patient. Will consider monitoring labs and refill medications as appropriate. Patient counseled and will continue current plan. Episode of recurrent major depressive disorder ([facility]  On effexor,  Patient is compliant w medications, no side effects, effective, provides adequate symptom relief. No new symptoms or problems as noted by patient. The problem is stable, no changes noted by patient. Will consider monitoring labs and refill medications as appropriate. Patient counseled and will continue current plan.      Hypokalemia  Has been corrected,       Alcohol consumption binge drinking  Counseled,   Will refer to counselor,            Plan:      Refill meds,  Refer to psych and counseling,         Lesley Keene MD

## 2020-11-04 NOTE — ASSESSMENT & PLAN NOTE
On latuda and lamictal   Patient is compliant w medications, no side effects, effective, provides adequate symptom relief. No new symptoms or problems as noted by patient. The problem is stable, no changes noted by patient. Will consider monitoring labs and refill medications as appropriate. Patient counseled and will continue current plan.

## 2022-10-25 ENCOUNTER — HOSPITAL ENCOUNTER (EMERGENCY)
Age: 37
Discharge: HOME | End: 2022-10-26
Payer: COMMERCIAL

## 2022-10-25 VITALS
TEMPERATURE: 98.4 F | WEIGHT: 143.8 LBS | OXYGEN SATURATION: 100 % | RESPIRATION RATE: 18 BRPM | HEIGHT: 61 IN | BODY MASS INDEX: 27.15 KG/M2 | DIASTOLIC BLOOD PRESSURE: 118 MMHG | HEART RATE: 61 BPM | SYSTOLIC BLOOD PRESSURE: 146 MMHG

## 2022-10-25 DIAGNOSIS — K57.32 DIVERTICULITIS OF COLON: ICD-10-CM

## 2022-10-25 DIAGNOSIS — N39.0 URINARY TRACT INFECTION IN FEMALE: Primary | ICD-10-CM

## 2022-10-25 PROCEDURE — 87186 SC STD MICRODIL/AGAR DIL: CPT

## 2022-10-25 PROCEDURE — 99284 EMERGENCY DEPT VISIT MOD MDM: CPT

## 2022-10-25 PROCEDURE — 87088 URINE BACTERIA CULTURE: CPT

## 2022-10-25 PROCEDURE — 87086 URINE CULTURE/COLONY COUNT: CPT

## 2022-10-25 ASSESSMENT — ENCOUNTER SYMPTOMS
ABDOMINAL PAIN: 1
SHORTNESS OF BREATH: 0
CHEST TIGHTNESS: 0
DIARRHEA: 0
NAUSEA: 0
VOMITING: 0

## 2022-10-25 ASSESSMENT — PAIN SCALES - GENERAL: PAINLEVEL_OUTOF10: 8

## 2022-10-25 ASSESSMENT — PAIN - FUNCTIONAL ASSESSMENT: PAIN_FUNCTIONAL_ASSESSMENT: 0-10

## 2022-10-25 NOTE — Clinical Note
Geronimo Dash was seen and treated in our emergency department on 10/25/2022. She may return to work on 10/28/2022. If you have any questions or concerns, please don't hesitate to call.       Pam Multani, APRN - CNP

## 2022-10-26 ENCOUNTER — APPOINTMENT (OUTPATIENT)
Dept: CT IMAGING | Age: 37
End: 2022-10-26
Payer: COMMERCIAL

## 2022-10-26 LAB
A/G RATIO: 2.2 (ref 1.1–2.2)
ALBUMIN SERPL-MCNC: 4.3 G/DL (ref 3.4–5)
ALP BLD-CCNC: 66 U/L (ref 40–129)
ALT SERPL-CCNC: <5 U/L (ref 10–40)
ANION GAP SERPL CALCULATED.3IONS-SCNC: 8 MMOL/L (ref 3–16)
AST SERPL-CCNC: 16 U/L (ref 15–37)
BACTERIA: ABNORMAL /HPF
BASOPHILS ABSOLUTE: 0 K/UL (ref 0–0.2)
BASOPHILS RELATIVE PERCENT: 0.5 %
BILIRUB SERPL-MCNC: 0.3 MG/DL (ref 0–1)
BILIRUBIN URINE: NEGATIVE
BLOOD, URINE: ABNORMAL
BUN BLDV-MCNC: 10 MG/DL (ref 7–20)
CALCIUM SERPL-MCNC: 9.4 MG/DL (ref 8.3–10.6)
CHLORIDE BLD-SCNC: 101 MMOL/L (ref 99–110)
CLARITY: ABNORMAL
CO2: 30 MMOL/L (ref 21–32)
COLOR: YELLOW
CREAT SERPL-MCNC: 0.7 MG/DL (ref 0.6–1.1)
EOSINOPHILS ABSOLUTE: 0 K/UL (ref 0–0.6)
EOSINOPHILS RELATIVE PERCENT: 0.7 %
EPITHELIAL CELLS, UA: 1 /HPF (ref 0–5)
GFR SERPL CREATININE-BSD FRML MDRD: >60 ML/MIN/{1.73_M2}
GLUCOSE BLD-MCNC: 106 MG/DL (ref 70–99)
GLUCOSE URINE: NEGATIVE MG/DL
HCG QUALITATIVE: NEGATIVE
HCT VFR BLD CALC: 35.7 % (ref 36–48)
HEMOGLOBIN: 11.7 G/DL (ref 12–16)
HYALINE CASTS: 2 /LPF (ref 0–8)
KETONES, URINE: NEGATIVE MG/DL
LEUKOCYTE ESTERASE, URINE: ABNORMAL
LIPASE: 30 U/L (ref 13–60)
LYMPHOCYTES ABSOLUTE: 2 K/UL (ref 1–5.1)
LYMPHOCYTES RELATIVE PERCENT: 30.9 %
MCH RBC QN AUTO: 28.7 PG (ref 26–34)
MCHC RBC AUTO-ENTMCNC: 32.8 G/DL (ref 31–36)
MCV RBC AUTO: 87.6 FL (ref 80–100)
MICROSCOPIC EXAMINATION: YES
MONOCYTES ABSOLUTE: 0.6 K/UL (ref 0–1.3)
MONOCYTES RELATIVE PERCENT: 9.2 %
NEUTROPHILS ABSOLUTE: 3.8 K/UL (ref 1.7–7.7)
NEUTROPHILS RELATIVE PERCENT: 58.7 %
NITRITE, URINE: NEGATIVE
PDW BLD-RTO: 13.8 % (ref 12.4–15.4)
PH UA: 7 (ref 5–8)
PLATELET # BLD: 205 K/UL (ref 135–450)
PMV BLD AUTO: 7.8 FL (ref 5–10.5)
POTASSIUM SERPL-SCNC: 3.3 MMOL/L (ref 3.5–5.1)
PROTEIN UA: 30 MG/DL
RBC # BLD: 4.08 M/UL (ref 4–5.2)
RBC UA: 2 /HPF (ref 0–4)
SODIUM BLD-SCNC: 139 MMOL/L (ref 136–145)
SPECIFIC GRAVITY UA: <=1.005 (ref 1–1.03)
TOTAL PROTEIN: 6.3 G/DL (ref 6.4–8.2)
URINE REFLEX TO CULTURE: YES
URINE TYPE: ABNORMAL
UROBILINOGEN, URINE: 0.2 E.U./DL
WBC # BLD: 6.4 K/UL (ref 4–11)
WBC UA: 90 /HPF (ref 0–5)

## 2022-10-26 PROCEDURE — 81001 URINALYSIS AUTO W/SCOPE: CPT

## 2022-10-26 PROCEDURE — 83690 ASSAY OF LIPASE: CPT

## 2022-10-26 PROCEDURE — 6370000000 HC RX 637 (ALT 250 FOR IP): Performed by: NURSE PRACTITIONER

## 2022-10-26 PROCEDURE — 74176 CT ABD & PELVIS W/O CONTRAST: CPT

## 2022-10-26 PROCEDURE — 80053 COMPREHEN METABOLIC PANEL: CPT

## 2022-10-26 PROCEDURE — 36415 COLL VENOUS BLD VENIPUNCTURE: CPT

## 2022-10-26 PROCEDURE — 85025 COMPLETE CBC W/AUTO DIFF WBC: CPT

## 2022-10-26 PROCEDURE — 84703 CHORIONIC GONADOTROPIN ASSAY: CPT

## 2022-10-26 RX ORDER — CIPROFLOXACIN 500 MG/1
500 TABLET, FILM COATED ORAL ONCE
Status: COMPLETED | OUTPATIENT
Start: 2022-10-26 | End: 2022-10-26

## 2022-10-26 RX ORDER — METRONIDAZOLE 250 MG/1
500 TABLET ORAL ONCE
Status: COMPLETED | OUTPATIENT
Start: 2022-10-26 | End: 2022-10-26

## 2022-10-26 RX ORDER — ACETAMINOPHEN 500 MG
1000 TABLET ORAL EVERY 6 HOURS PRN
Qty: 30 TABLET | Refills: 0 | Status: SHIPPED | OUTPATIENT
Start: 2022-10-26

## 2022-10-26 RX ORDER — METRONIDAZOLE 500 MG/1
500 TABLET ORAL 2 TIMES DAILY
Qty: 20 TABLET | Refills: 0 | Status: SHIPPED | OUTPATIENT
Start: 2022-10-26 | End: 2022-11-05

## 2022-10-26 RX ORDER — CIPROFLOXACIN 500 MG/1
500 TABLET, FILM COATED ORAL 2 TIMES DAILY
Qty: 20 TABLET | Refills: 0 | Status: SHIPPED | OUTPATIENT
Start: 2022-10-26 | End: 2022-11-05

## 2022-10-26 RX ADMIN — CIPROFLOXACIN 500 MG: 500 TABLET, FILM COATED ORAL at 01:51

## 2022-10-26 RX ADMIN — METRONIDAZOLE 500 MG: 250 TABLET ORAL at 01:51

## 2022-10-26 NOTE — ED PROVIDER NOTES
[address] [facility]        Pt Name: Aj Pathak  MRN: [id number]  [place] 1985  Date of evaluation: 10/25/2022  Provider: MORTEZA Diaz CNP  PCP: No primary care provider on file. Note Started: 11:48 PM EDT       SHERRY. I have evaluated this patient. My supervising physician was available for consultation. CHIEF COMPLAINT       Chief Complaint   Patient presents with    Abdominal Pain     Pt has had urinary urgency and burning with urination for one week, symptoms have increased today new onset lower abdominal pain and left sided flank pain. HISTORY OF PRESENT ILLNESS   (Location, Timing/Onset, Context/Setting, Quality, Duration, Modifying Factors, Severity, Associated Signs and Symptoms)  Note limiting factors. Chief Complaint: Urinary urgency, frequency, dysuria, and flank pain    Aj Pathak is a 40 y.o. female who presents to the emergency department with complaint of urinary urgency, frequency, dysuria, and flank pain. The patient reports onset of symptoms about 1 week ago and worse this evening. Reports that the flank pain is on the left side. Denies vomiting. No concern for STD. Denies any headache, fever, lightheadedness, dizziness, visual disturbances. No chest pain or pressure. No neck or back pain. No shortness of breath, cough, or congestion. No nausea, vomiting, diarrhea, constipation. No rash. Nursing Notes were all reviewed and agreed with or any disagreements were addressed in the HPI. REVIEW OF SYSTEMS    (2-9 systems for level 4, 10 or more for level 5)     Review of Systems   Constitutional:  Negative for activity change, chills and fever. Respiratory:  Negative for chest tightness and shortness of breath. Cardiovascular:  Negative for chest pain. Gastrointestinal:  Positive for abdominal pain. Negative for diarrhea, nausea and vomiting.    Genitourinary:  Positive for dysuria, frequency and urgency. All other systems reviewed and are negative. Positives and Pertinent negatives as per HPI. Except as noted above in the ROS, all other systems were reviewed and negative.        PAST MEDICAL HISTORY     Past Medical History:   Diagnosis Date    Bipolar 1 disorder ([facility] [address] 9/17/2020    Bipolar disorder ([facility] [address] 9/17/2020    Episode of recurrent major depressive disorder [facility] [place] 5/25/2011         SURGICAL HISTORY     Past Surgical History:   Procedure Laterality Date    COSMETIC SURGERY           CURRENTMEDICATIONS       Discharge Medication List as of 10/26/2022  1:56 AM        CONTINUE these medications which have NOT CHANGED    Details   lurasidone (LATUDA) 80 MG TABS tablet Take 1 tablet by mouth daily, Disp-30 tablet,R-3Normal      lamoTRIgine (LAMICTAL) 25 MG tablet Take 2 tablets by mouth daily, Disp-60 tablet,R-3Normal      venlafaxine (EFFEXOR XR) 37.5 MG extended release capsule Take 1 capsule by mouth daily, Disp-30 capsule,R-3Normal      naltrexone (DEPADE) 50 MG tablet Take 1 tablet by mouth daily (with breakfast), Disp-30 tablet,R-0Normal      paliperidone palmitate ER (INVEGA SUSTENNA) 156 MG/ML BRAULIO IM injection Inject 156 mg into the muscle every 30 days Due 10/10/2020, Intramuscular, EVERY 30 DAYS Starting Sat 10/10/2020, Disp-1.2 mg,R-0, Print               ALLERGIES     Latex    FAMILYHISTORY       Family History   Problem Relation Age of Onset    No Known Problems Mother     No Known Problems Father           SOCIAL HISTORY       Social History     Tobacco Use    Smoking status: Never    Smokeless tobacco: Never   Substance Use Topics    Alcohol use: Yes     Comment: socially    Drug use: No       SCREENINGS    Nolberto Coma Scale  Eye Opening: Spontaneous  Best Verbal Response: Oriented  Best Motor Response: Obeys commands  [place] Coma Scale Score: 15        PHYSICAL EXAM    (up to 7 for level 4, 8 or more for level 5)     ED Triage Vitals [10/25/22 2331]   BP Temp Temp src Heart Rate Resp SpO2 Height Weight   (!) 146/118 98.4 °F (36.9 °C) -- 61 18 100 % 5' 1\" (1.549 m) 143 lb 12.8 oz (65.2 kg)       Physical Exam  Vitals and nursing note reviewed. Constitutional:       Appearance: She is well-developed. She is not diaphoretic. HENT:      Head: Normocephalic and atraumatic. Right Ear: External ear normal.      Left Ear: External ear normal.   Eyes:      General:         Right eye: No discharge. Left eye: No discharge. Neck:      Vascular: No JVD. Cardiovascular:      Rate and Rhythm: Normal rate and regular rhythm. Pulses: Normal pulses. Heart sounds: Normal heart sounds. Pulmonary:      Effort: Pulmonary effort is normal. No respiratory distress. Breath sounds: Normal breath sounds. Abdominal:      Palpations: Abdomen is soft. Tenderness: There is abdominal tenderness. Musculoskeletal:         General: Normal range of motion. Skin:     General: Skin is warm and dry. Coloration: Skin is not pale. Neurological:      Mental Status: She is alert.    Psychiatric:         Behavior: Behavior normal.       DIAGNOSTIC RESULTS   LABS:    Labs Reviewed   CBC WITH AUTO DIFFERENTIAL - Abnormal; Notable for the following components:       Result Value    Hemoglobin 11.7 (*)     Hematocrit 35.7 (*)     All other components within normal limits   COMPREHENSIVE METABOLIC PANEL - Abnormal; Notable for the following components:    Potassium 3.3 (*)     Glucose 106 (*)     Total Protein 6.3 (*)     ALT <5 (*)     All other components within normal limits   URINALYSIS WITH REFLEX TO CULTURE - Abnormal; Notable for the following components:    Clarity, UA CLOUDY (*)     Blood, Urine SMALL (*)     Protein, UA 30 (*)     Leukocyte Esterase, Urine LARGE (*)     All other components within normal limits   MICROSCOPIC URINALYSIS - Abnormal; Notable for the following components:    Bacteria, UA 4+ (*)     WBC, UA 90 (*)     All other components within normal limits   CULTURE, URINE   LIPASE   HCG, SERUM, QUALITATIVE       When ordered only abnormal lab results are displayed. All other labs were within normal range or not returned as of this dictation. EKG: When ordered, EKG's are interpreted by the Emergency Department Physician in the absence of a cardiologist.  Please see their note for interpretation of EKG. RADIOLOGY:   Non-plain film images such as CT, Ultrasound and MRI are read by the radiologist. Plain radiographic images are visualized and preliminarily interpreted by the ED Provider with the below findings:        Interpretation per the Radiologist below, if available at the time of this note:    CT ABDOMEN PELVIS WO CONTRAST Additional Contrast? None   Final Result   Mild acute diverticulitis involving the mid descending colon. No evidence of   abscess. No results found. PROCEDURES   Unless otherwise noted below, none     Procedures    CRITICAL CARE TIME       CONSULTS:  None      EMERGENCY DEPARTMENT COURSE and DIFFERENTIAL DIAGNOSIS/MDM:   Vitals:    Vitals:    10/25/22 2331   BP: (!) 146/118   Pulse: 61   Resp: 18   Temp: 98.4 °F (36.9 °C)   SpO2: 100%   Weight: 143 lb 12.8 oz (65.2 kg)   Height: 5' 1\" (1.549 m)       Patient was given the following medications:  Medications   ciprofloxacin (CIPRO) tablet 500 mg (500 mg Oral Given 10/26/22 0151)   metroNIDAZOLE (FLAGYL) tablet 500 mg (500 mg Oral Given 10/26/22 0151)         Is this patient to be included in the SEP-1 Core Measure due to severe sepsis or septic shock? No   Exclusion criteria - the patient is NOT to be included for SEP-1 Core Measure due to:  2+ SIRS criteria are not met    Briefly, this is a 61-year-old female with history of bipolar that presents to the emergency department with flank pain and urinary symptoms over the past 1 week. UA will be sent for culture and does appear infected. Labs are otherwise unremarkable.     CT ABDOMEN PELVIS WO CONTRAST Additional Contrast? None (Final result)  Result time 10/26/22 01:18:17  Final result by Mandy Barakat MD (10/26/22 01:18:17)                Impression:    Mild acute diverticulitis involving the mid descending colon. No evidence of   abscess. She will be treated with cipro and flagyl which will treat both cystitis as well as diverticulitis. Follow up with primary care, referral provided. Strict return precautions and close outpatient follow up recommended. I see nothing that would suggest an acute abdomen at this time. Based on history, physical exam, risk factors, and tests my suspicion for bowel obstruction, incarcerated hernia, acute pancreatitis, intra-abdominal abscess, perforated viscus,  cholecystitis, appendicitis, PID, ovarian torsion, ectopic pregnancy and tubo-ovarian abscess is very low. There is no evidence of peritonitis, sepsis or toxicity at this time. I feel the patient can be managed as an outpatient with follow-up with her family doctor in 24-48 hours. Instructions have been given for the patient to return to the ED for worsening of the pain, high fevers, intractable vomiting, or bleeding. FINAL IMPRESSION      1. Urinary tract infection in female    2.  Diverticulitis of colon          DISPOSITION/PLAN   DISPOSITION Decision To Discharge 10/26/2022 01:38:11 AM      PATIENT REFERRED TO:  [facility]) Pre-Services  [phone]        DISCHARGE MEDICATIONS:  Discharge Medication List as of 10/26/2022  1:56 AM        START taking these medications    Details   acetaminophen (TYLENOL) 500 MG tablet Take 2 tablets by mouth every 6 hours as needed for Pain, Disp-30 tablet, R-0Print      ciprofloxacin (CIPRO) 500 MG tablet Take 1 tablet by mouth 2 times daily for 10 days, Disp-20 tablet, R-0Print      metroNIDAZOLE (FLAGYL) 500 MG tablet Take 1 tablet by mouth 2 times daily for 10 days, Disp-20 tablet, R-0Print             DISCONTINUED MEDICATIONS:  Discharge Medication List as of 10/26/2022  1:56 AM                 (Please note that portions of this note were completed with a voice recognition program.  Efforts were made to edit the dictations but occasionally words are mis-transcribed.)    MORTEZA France - ROBEROT (electronically signed)            MORTEZA France CNP  10/26/22 6412

## 2022-10-27 LAB
ORGANISM: ABNORMAL
URINE CULTURE, ROUTINE: ABNORMAL

## 2022-10-28 ENCOUNTER — TELEPHONE (OUTPATIENT)
Dept: PRIMARY CARE CLINIC | Age: 37
End: 2022-10-28

## 2022-10-28 NOTE — TELEPHONE ENCOUNTER
[facility] Transitions Initial Follow Up Call    Outreach made within 2 business days of discharge: Yes    Patient: Lonzo Cheadle Patient : 1985   MRN: [id number]  Reason for Admission: There are no discharge diagnoses documented for the most recent discharge. Discharge Date: 10/26/22       Voicemail left to call and schedule ED follow up with PCP. Scheduled appointment with PCP within 7-14 days    Follow Up  No future appointments.     Francesca Woods